# Patient Record
Sex: FEMALE | Race: WHITE | NOT HISPANIC OR LATINO | Employment: UNEMPLOYED | URBAN - METROPOLITAN AREA
[De-identification: names, ages, dates, MRNs, and addresses within clinical notes are randomized per-mention and may not be internally consistent; named-entity substitution may affect disease eponyms.]

---

## 2018-03-01 DIAGNOSIS — R15.1 FECAL SOILING: ICD-10-CM

## 2018-03-01 DIAGNOSIS — R32 URINE INCONTINENCE: Primary | ICD-10-CM

## 2018-03-07 ENCOUNTER — CLINICAL SUPPORT (OUTPATIENT)
Dept: REHABILITATION | Facility: OTHER | Age: 38
End: 2018-03-07
Attending: OBSTETRICS & GYNECOLOGY
Payer: COMMERCIAL

## 2018-03-07 DIAGNOSIS — N94.10 DYSPAREUNIA, FEMALE: ICD-10-CM

## 2018-03-07 DIAGNOSIS — M62.838 MUSCLE SPASM: ICD-10-CM

## 2018-03-07 DIAGNOSIS — R39.15 URINARY URGENCY: ICD-10-CM

## 2018-03-07 DIAGNOSIS — R35.0 URINARY FREQUENCY: Primary | ICD-10-CM

## 2018-03-07 PROCEDURE — 97162 PT EVAL MOD COMPLEX 30 MIN: CPT

## 2018-03-07 PROCEDURE — 97110 THERAPEUTIC EXERCISES: CPT

## 2018-03-07 PROCEDURE — 97161 PT EVAL LOW COMPLEX 20 MIN: CPT

## 2018-03-07 PROCEDURE — 97535 SELF CARE MNGMENT TRAINING: CPT

## 2018-03-07 NOTE — PATIENT INSTRUCTIONS
Walking daily 20-30 minutes    Avoid crossing legs when you sit, cross at ankles    Squatty Potty and exhalation with pooping (blowing out birthday candles)    Hook-Lying        Lie with hips and knees bent. Allow bodys muscles to relax. Place hands on belly. Inhale slowly and deeply for 3 seconds, so hands move up. Then take 3 seconds to exhale. Repeat 3 times. Do 2-3 times a day.      Knee-to-Chest Stretch: Unilateral        With hand behind right knee, pull knee in to chest until a comfortable stretch is felt in lower back and buttocks. Keep back relaxed. Hold 5 breaths.  Repeat 3 times per set. Repeat on Left and Right sides. Do 1 set per session. Do 2-3 sessions per day.     [email or web address]     Copyright © [employer]. All rights reserved.   Knee-to-Chest Stretch: Bilateral        With hands behind knees, pull both knees in to chest until a comfortable stretch is felt in lower back and buttocks. Keep back relaxed. Hold 5 breaths.  Repeat 3 times per set. Do 1 set per session. Do 2-3 sessions per day.     [email or web address]

## 2018-03-07 NOTE — PROGRESS NOTES
OUTPATIENT PHYSICAL THERAPY EVALUATION        Patient: Akanksha Campos   [facility] #:  [id number]    Date of treatment: 03/07/2018   Time in: 100  Time out: 200  Billable time: 60  # Visits: 1/20  Auth: 12/31/2018  POC expiration: 6/13/2018      HISTORY      Akanksha is a 37 y.o. female evaluated on 03/07/2018    Physician:  Kathie Howell MD   Diagnosis:   Encounter Diagnoses   Name Primary?    Urinary frequency Yes    Dyspareunia, female     Urinary urgency     Muscle spasm       Treatment ordered: Physical Therapy  Medical History:   Past Medical History:   Diagnosis Date    Anxiety     Depression       Surgical History: No past surgical history on file.   Medications:   Current Outpatient Prescriptions   Medication Sig    PNV #[id number] #6-DHA ORAL Take by mouth.     No current facility-administered medications for this visit.        Allergies: Review of patient's allergies indicates:  No Known Allergies     Precautions: universal    OB/GYN History:  childbirth vaginal delivery and perineal laceration    Bladder/Bowel History: urinary incontinence and trouble holding back gas/feces      SUBJECTIVE     Date of onset: 3 years ago following birth of first son  History of current complaint: Patient presents with complaints of urinary and fecal incontinence. She has a 2-3 degree tear following unmedicated vaginal delivery of her first child. Symptoms persisted and continue after birth of her second. She has decreased anorectal sensation per an anorectal manometry test. She stats she feels like she cannot get clean in her anal area following a bowel movement. She has recently been using a bidae at home which helps. She has 1-2 BMs per day that are soft. She feels she does not empty completely. She wears ICON underwear for urinary incontinence. She voids 6-8 times day. She states she doesn't know when urinary incontinence occurs because she doesn't feel it. She is currently sexually active and denies current pan  with intercourse, although did have dyspareunia following first childbirth. She is still nursing her 10 month old child. Menstruation has not returned.   Patient's goals for therapy: Decreased urinary and fecal incontinence  Pain: Patient reports 0/10, with 0 being the lowest and 10 being the highest.    Previous treatment included PT by Mami Dejesus in 2015    Sexually active? Yes    Types of fluid intake: water  Diet:normal  Current exercise:walking and high intensity exercise      OBJECTIVE     ORTHO SCREEN  Posture: WNL and flexed posturing  Pelvic alignment: no sign of deviations noted in supine    ABDOMINALS  Scarring: none  Diastasis: 1 fingers above umbilicus, 0 fingers at umbilicus, 0 fingers below umbilicus   Chaperone: refused    VAGINAL PELVIC FLOOR EXAM    EXTERNAL ASSESSMENT  Introitus: WNL  Skin condition: redness noted  Scarring: scar noted   Sensation: WNL   Pain:  Q tip test positive at 3 and 6 o clock  Voluntary contraction: visible lift  Voluntary relaxation: visible drop  Involuntary contraction: bulge  Bearing down: bulge  Perineal descent: absent      INTERNAL ASSESSMENT  Pain: trigger points as follows: Left and Right LA and OI introital opening  Sensation: WNL  Vaginal vault: decreased length   Muscle Bulk: hypertonus   Prolapse check:redundant tissue noted anteriorly  Comments: urethral hypermobility    RECTAL PELVIC FLOOR EXAM  EXTERNAL ASSESSMENT  Anus: WNL  Skin condition: WNL   Scarring: none  Sensation: WNL   Pain:  none  Voluntary contraction: visible lift  Voluntary relaxation: visible drop  Involuntary contraction: bulge  Bearing down: bulge  Anal [place]: intact  Discharge: none       INTERNAL ASSESSMENT  EAS tone: hypertonic   Impaction: none   Pain: trigger points as follows: L and Right LA and OI  Sensation: able to localize pressure appropriately   Muscle Bulk: hypertonus   Comments: proper bulge with bearing down      TREATMENT      Therapeutic Exercise to develop  flexibility for  10 minutes including: diaphragmatic breathing with knee to chest stretches    Education: instructed on general anatomy/physiology of urinary/bowel system; discussed plan of care with patient and parent/guardian; instructed in benefits/risks of treatment; instructed in alternative methods of treatment; instructed in risks of refusing treatment; patient a parent agreed to treatment plan.     Also educated in: posture/body mechanices, diaphragmatic breathing, proper bearing down techniques and walking    ASSESSMENT      This is a 37 y.o. female referred to outpatient physical therapy and presents with a medical diagnosis of urinary and fecal incontinence. Patient will benefit from skilled physical therapy to downtrain pelvic floor muscles and progress to uptraining as muscles return to normal resting length.  Educational/Spiritual/Cultural needs: none  Abuse/Neglect: no signs  Nutritional Status: WDWN   Fall Risk: none    Pt's spiritual, cultural and educational needs considered and pt agreeable to plan of care and goals as stated below:     Medical necessity is demonstrated by the following IMPAIRMENTS/PROMBLEMS     History  Co-morbidities and personal factors that may impact the plan of care Examination  Body Structures and Functions, activity limitations and participation restrictions that may impact the plan of care Clinical Presentation   Decision Making/ Complexity Score   Co-morbidities:                 Personal Factors:    Body Regions/Systems/Functions:    poor knowledge of body mechanics and posture, pelvic floor tenderness and increased tension of the pelvic muscles           Activity limitations:   Barriers to Learning: none  Environmental Barriers: none noted    Participation Restrictions:           low           PLAN    Frequency: 1 x week  Duration: 12 weeks  Short Term Goals: 4 weeks   Pt will report improved ability to perform ADLs (ie. dressing, bathing, functional transfers) with little (drops) to no  urinary leakage 5/7 days per week.  Pt will verbalize improved awareness of PFM activity as palpated by PT in order to improve activity involvement with HEP.    Long Term Goals: 12 weeks   Pt will report improved ability to perform ADLs (ie. dressing, bathing, functional transfers) with little (drops) to no fecal leakage 5/7 days per week.   Pt/family will be independent with HEP for continued self-management of symptoms.    Physical therapy will include: therapeutic exercises, therapeutic activity, neuromuscular re-education, manual therapy, modalities PRN, patient/family education, dry needling and self care/home management      Therapist: Noemí Lynch, PT PT, DPT, WCS  3/7/2018

## 2018-03-13 NOTE — PLAN OF CARE
OUTPATIENT PHYSICAL THERAPY EVALUATION          Patient: Akanksha Campos   [facility] #:  [id number]    Date of treatment: 03/07/2018   Time in: 100  Time out: 200  Billable time: 60  # Visits: 1/20  Auth: 12/31/2018  POC expiration: 6/13/2018        HISTORY       Akanksha is a 37 y.o. female evaluated on 03/07/2018     Physician:  Kathie Howell MD   Diagnosis:        Encounter Diagnoses   Name Primary?    Urinary frequency Yes    Dyspareunia, female      Urinary urgency      Muscle spasm        Treatment ordered: Physical Therapy  Medical History:        Past Medical History:   Diagnosis Date    Anxiety      Depression        Surgical History: No past surgical history on file.   Medications:        Current Outpatient Prescriptions   Medication Sig    PNV #35-[id number] #6-DHA ORAL Take by mouth.      No current facility-administered medications for this visit.        Allergies: Review of patient's allergies indicates:  No Known Allergies      Precautions: universal     OB/GYN History:  childbirth vaginal delivery and perineal laceration     Bladder/Bowel History: urinary incontinence and trouble holding back gas/feces        SUBJECTIVE      Date of onset: 3 years ago following birth of first son  History of current complaint: Patient presents with complaints of urinary and fecal incontinence. She has a 2-3 degree tear following unmedicated vaginal delivery of her first child. Symptoms persisted and continue after birth of her second. She has decreased anorectal sensation per an anorectal manometry test. She stats she feels like she cannot get clean in her anal area following a bowel movement. She has recently been using a bidae at home which helps. She has 1-2 BMs per day that are soft. She feels she does not empty completely. She wears ICON underwear for urinary incontinence. She voids 6-8 times day. She states she doesn't know when urinary incontinence occurs because she doesn't feel it. She is currently sexually  active and denies current pan with intercourse, although did have dyspareunia following first childbirth. She is still nursing her 10 month old child. Menstruation has not returned.   Patient's goals for therapy: Decreased urinary and fecal incontinence  Pain: Patient reports 0/10, with 0 being the lowest and 10 being the highest.     Previous treatment included PT by Mami Dejesus in 2015     Sexually active? Yes     Types of fluid intake: water  Diet:normal  Current exercise:walking and high intensity exercise        OBJECTIVE      ORTHO SCREEN  Posture: WNL and flexed posturing  Pelvic alignment: no sign of deviations noted in supine     ABDOMINALS  Scarring: none  Diastasis: 1 fingers above umbilicus, 0 fingers at umbilicus, 0 fingers below umbilicus   Chaperone: refused     VAGINAL PELVIC FLOOR EXAM     EXTERNAL ASSESSMENT  Introitus: WNL  Skin condition: redness noted  Scarring: scar noted   Sensation: WNL   Pain:  Q tip test positive at 3 and 6 o clock  Voluntary contraction: visible lift  Voluntary relaxation: visible drop  Involuntary contraction: bulge  Bearing down: bulge  Perineal descent: absent                            INTERNAL ASSESSMENT  Pain: trigger points as follows: Left and Right LA and OI introital opening  Sensation: WNL  Vaginal vault: decreased length   Muscle Bulk: hypertonus   Prolapse check:redundant tissue noted anteriorly  Comments: urethral hypermobility     RECTAL PELVIC FLOOR EXAM  EXTERNAL ASSESSMENT  Anus: WNL  Skin condition: WNL   Scarring: none  Sensation: WNL   Pain:  none  Voluntary contraction: visible lift  Voluntary relaxation: visible drop  Involuntary contraction: bulge  Bearing down: bulge  Anal [place]: intact  Discharge: none                             INTERNAL ASSESSMENT  EAS tone: hypertonic   Impaction: none   Pain: trigger points as follows: L and Right LA and OI  Sensation: able to localize pressure appropriately   Muscle Bulk: hypertonus   Comments: proper bulge  with bearing down        TREATMENT       Therapeutic Exercise to develop  flexibility for 10 minutes including: diaphragmatic breathing with knee to chest stretches     Education: instructed on general anatomy/physiology of urinary/bowel system; discussed plan of care with patient and parent/guardian; instructed in benefits/risks of treatment; instructed in alternative methods of treatment; instructed in risks of refusing treatment; patient a parent agreed to treatment plan.      Also educated in: posture/body mechanices, diaphragmatic breathing, proper bearing down techniques and walking     ASSESSMENT       This is a 37 y.o. female referred to outpatient physical therapy and presents with a medical diagnosis of urinary and fecal incontinence. Patient will benefit from skilled physical therapy to downtrain pelvic floor muscles and progress to uptraining as muscles return to normal resting length.  Educational/Spiritual/Cultural needs: none  Abuse/Neglect: no signs  Nutritional Status: WDWN   Fall Risk: none     Pt's spiritual, cultural and educational needs considered and pt agreeable to plan of care and goals as stated below:      Medical necessity is demonstrated by the following IMPAIRMENTS/PROMBLEMS      History  Co-morbidities and personal factors that may impact the plan of care Examination  Body Structures and Functions, activity limitations and participation restrictions that may impact the plan of care Clinical Presentation    Decision Making/ Complexity Score   Co-morbidities:                        Personal Factors:     Body Regions/Systems/Functions:     poor knowledge of body mechanics and posture, pelvic floor tenderness and increased tension of the pelvic muscles               Activity limitations:   Barriers to Learning: none  Environmental Barriers: none noted     Participation Restrictions:                low               PLAN    Frequency: 1 x week  Duration: 12 weeks  Short Term Goals: 4 weeks    Pt will report improved ability to perform ADLs (ie. dressing, bathing, functional transfers) with little (drops) to no urinary leakage 5/7 days per week.  Pt will verbalize improved awareness of PFM activity as palpated by PT in order to improve activity involvement with HEP.     Long Term Goals: 12 weeks   Pt will report improved ability to perform ADLs (ie. dressing, bathing, functional transfers) with little (drops) to no fecal leakage 5/7 days per week.   Pt/family will be independent with HEP for continued self-management of symptoms.     Physical therapy will include: therapeutic exercises, therapeutic activity, neuromuscular re-education, manual therapy, modalities PRN, patient/family education, dry needling and self care/home management        Therapist: Noemí Lynch, PT PT, DPT, WCS  3/7/2018

## 2018-03-21 ENCOUNTER — CLINICAL SUPPORT (OUTPATIENT)
Dept: REHABILITATION | Facility: OTHER | Age: 38
End: 2018-03-21
Attending: OBSTETRICS & GYNECOLOGY
Payer: COMMERCIAL

## 2018-03-21 DIAGNOSIS — N94.10 DYSPAREUNIA, FEMALE: Primary | ICD-10-CM

## 2018-03-21 DIAGNOSIS — R15.1 FECAL SMEARING: ICD-10-CM

## 2018-03-21 DIAGNOSIS — R39.15 URINARY URGENCY: ICD-10-CM

## 2018-03-21 DIAGNOSIS — R35.0 URINARY FREQUENCY: ICD-10-CM

## 2018-03-21 PROCEDURE — 97110 THERAPEUTIC EXERCISES: CPT

## 2018-03-21 PROCEDURE — 97140 MANUAL THERAPY 1/> REGIONS: CPT

## 2018-03-21 PROCEDURE — 97535 SELF CARE MNGMENT TRAINING: CPT

## 2018-03-21 NOTE — PROGRESS NOTES
OUTPATIENT PHYSICAL THERAPY DAILY NOTE       Patient: Akanksha Campos   [facility] #:  [id number]    Diagnosis:   Encounter Diagnoses   Name Primary?    Dyspareunia, female Yes    Urinary frequency     Urinary urgency     Fecal smearing       Date of treatment: 03/21/2018     Time in: 903  Time out: 1000  Billable time: 55  Visit #: 2      SUBJECTIVE   Pt reports: Patient states she is under very high stress due to an upcoming move. She states this typically causes diarrhea and loose stool. When she has a bowel movement she empties 60-70% of stool then feels she has to go again later or has some fecal smearing. Continued urinary urgency and incontinence. She is taking a sleeping calm supplement with Magnesium which is contributing to softer stools.   Pain: 0/10 on VAS scale    OBJECTIVE     Therapeutic Exercise to develop  flexibility for 10 minutes including:   diaphragmatic breathing and passive stretching of pfm : Knee to chest stretches in supine and standing squat  Practice with diaphragmatic breath with PFM contraction    Manual Therapy to develop flexibility and desensitization for 30 minutes including: Manual Therapy including STM and Trigger point massage to abdominals, adductors, tissues medial to ischial tuberosities, intravaginal MT to levator ani and intrarectal to puborectalis    Education: Discussed progression of plan of care with patient; educated pt in activity modification; reviewed HEP with pt. Pt demonstrated and verbalized understanding of all instruction and was provided with a handout of HEP (see Patient Instructions).  Order Therawand      ASSESSMENT      Pt tolerated entire treatment  with no visible adverse effects. Increased tension of puborectalis and tenderness to palpation. Decreased tension to levator ani intravaginally.   Will the patient continue to benefit from skilled PT intervention? Yes  Medical necessity demonstrated by: incontinence  Progress towards goals:  good      PLAN      Continue with established Plan of Care, working toward established PT goals.   Educate on Use of Therawand for home use vaginally OR rectally, intravaginal and Intrarectal Mt. Progress to uptraining when muscles rlaxed

## 2018-04-02 ENCOUNTER — OFFICE VISIT (OUTPATIENT)
Dept: URGENT CARE | Facility: CLINIC | Age: 38
End: 2018-04-02
Payer: COMMERCIAL

## 2018-04-02 VITALS
WEIGHT: 204 LBS | OXYGEN SATURATION: 97 % | SYSTOLIC BLOOD PRESSURE: 112 MMHG | HEART RATE: 78 BPM | HEIGHT: 69 IN | RESPIRATION RATE: 18 BRPM | BODY MASS INDEX: 30.21 KG/M2 | DIASTOLIC BLOOD PRESSURE: 76 MMHG | TEMPERATURE: 98 F

## 2018-04-02 DIAGNOSIS — J06.9 UPPER RESPIRATORY TRACT INFECTION, UNSPECIFIED TYPE: ICD-10-CM

## 2018-04-02 DIAGNOSIS — J02.9 SORE THROAT: Primary | ICD-10-CM

## 2018-04-02 LAB
CTP QC/QA: YES
S PYO RRNA THROAT QL PROBE: NEGATIVE

## 2018-04-02 PROCEDURE — 96372 THER/PROPH/DIAG INJ SC/IM: CPT | Mod: S$GLB,,, | Performed by: EMERGENCY MEDICINE

## 2018-04-02 PROCEDURE — 99203 OFFICE O/P NEW LOW 30 MIN: CPT | Mod: 25,S$GLB,, | Performed by: PHYSICIAN ASSISTANT

## 2018-04-02 PROCEDURE — 87880 STREP A ASSAY W/OPTIC: CPT | Mod: QW,S$GLB,, | Performed by: PHYSICIAN ASSISTANT

## 2018-04-02 RX ORDER — BETAMETHASONE SODIUM PHOSPHATE AND BETAMETHASONE ACETATE 3; 3 MG/ML; MG/ML
6 INJECTION, SUSPENSION INTRA-ARTICULAR; INTRALESIONAL; INTRAMUSCULAR; SOFT TISSUE
Status: COMPLETED | OUTPATIENT
Start: 2018-04-02 | End: 2018-04-02

## 2018-04-02 RX ORDER — MULTIVITAMIN
1 TABLET ORAL DAILY
COMMUNITY

## 2018-04-02 RX ADMIN — BETAMETHASONE SODIUM PHOSPHATE AND BETAMETHASONE ACETATE 6 MG: 3; 3 INJECTION, SUSPENSION INTRA-ARTICULAR; INTRALESIONAL; INTRAMUSCULAR; SOFT TISSUE at 06:04

## 2018-04-02 NOTE — PATIENT INSTRUCTIONS
Self-Care for Sore Throats    Sore throats happen for many reasons, such as colds, allergies, and infections caused by viruses or bacteria. In any case, your throat becomes red and sore. Your goal for self-care is to reduce your discomfort while giving your throat a chance to heal.  Moisten and soothe your throat  Tips include the following:  · Try a sip of water first thing after waking up.  · Keep your throat moist by drinking 6 or more glasses of clear liquids every day.  · Run a cool-air humidifier in your room overnight.  · Avoid cigarette smoke.   · Suck on throat lozenges, cough drops, hard candy, ice chips, or frozen fruit-juice bars. Use the sugar-free versions if your diet or medical condition requires them.  Gargle to ease irritation  Gargling every hour or 2 can ease irritation. Try gargling with 1 of these solutions:  · 1/4 teaspoon of salt in 1/2 cup of warm water  · An over-the-counter anesthetic gargle  Use medicine for more relief  Over-the-counter medicine can reduce sore throat symptoms. Ask your pharmacist if you have questions about which medicine to use:  · Ease pain with anesthetic sprays. Aspirin or an aspirin substitute also helps. Remember, never give aspirin to anyone 18 or younger, or if you are already taking blood thinners.   · For sore throats caused by allergies, try antihistamines to block the allergic reaction.  · Remember: unless a sore throat is caused by a bacterial infection, antibiotics wont help you.  Prevent future sore throats  Prevention tips include the following:  · Stop smoking or reduce contact with secondhand smoke. Smoke irritates the tender throat lining.  · Limit contact with pets and with allergy-causing substances, such as pollen and mold.  · When youre around someone with a sore throat or cold, wash your hands often to keep viruses or bacteria from spreading.  · Dont strain your vocal cords.  Call your healthcare provider  Contact your healthcare provider if  you have:  · A temperature over 101°F (38.3°C)  · White spots on the throat  · Great difficulty swallowing  · Trouble breathing  · A skin rash  · Recent exposure to someone else with strep bacteria  · Severe hoarseness and swollen glands in the neck or jaw   Date Last Reviewed: 8/1/2016  © 9645-3312 [employer] [address] [place] [address]. All rights reserved. This information is not intended as a substitute for professional medical care. Always follow your healthcare professional's instructions.        When You Have a Sore Throat    A sore throat can be painful. There are many reasons why you may have a sore throat. Your healthcare provider will work with you to find the cause of your sore throat. He or she will also find the best treatment for you.  What causes a sore throat?  Sore throats can be caused or worsened by:  · Cold or flu viruses  · Bacteria  · Irritants such as tobacco smoke or air pollution  · Acid reflux  A healthy throat  The tonsils are on the sides of the throat near the base of the tongue. They collect viruses and bacteria and help fight infection. The throat (pharynx) is the passage for air. Mucus from the nasal cavity also moves down the passage.  An inflamed throat  The tonsils and pharynx can become inflamed due to a cold or flu virus. Postnasal drip (excess mucus draining from the nasal cavity) can irritate the throat. It can also make the throat or tonsils more likely to be infected by bacteria. Severe, untreated tonsillitis in children or adults can cause a pocket of pus (abscess) to form near the tonsil.  Your evaluation  A medical evaluation can help find the cause of your sore throat. It can also help your healthcare provider choose the best treatment for you. The evaluation may include a health history, physical exam, and diagnostic tests.  Health history  Your healthcare provider may ask you the following:  · How long has the sore throat lasted and how  have you been treating it?  · Do you have any other symptoms, such as body aches, fever, or cough?  · Does your sore throat recur? If so, how often? How many days of school or work have you missed because of a sore throat?  · Do you have trouble eating or swallowing?  · Have you been told that you snore or have other sleep problems?  · Do you have bad breath?  · Do you cough up bad-tasting mucus?  Physical exam  During the exam, your healthcare provider checks your ears, nose, and throat for problems. He or she also checks for swelling in the neck, and may listen to your chest.  Possible tests  Other tests your healthcare provider may perform include:  · A throat swab to check for bacteria such as streptococcus (the bacteria that causes strep throat)  · A blood test to check for mononucleosis (a viral infection)  · A chest X-ray to rule out pneumonia, especially if you have a cough  Treating a sore throat  Treatment depends on many factors. What is the likely cause? Is the problem recent? Does it keep coming back? In many cases, the best thing to do is to treat the symptoms, rest, and let the problem heal itself. Antibiotics may help clear up some bacterial infections. For cases of severe or recurring tonsillitis, the tonsils may need to be removed.  Relieving your symptoms  · Dont smoke, and avoid secondhand smoke.  · For children, try throat sprays or Popsicles. Adults and older children may try lozenges.  · Drink warm liquids to soothe the throat and help thin mucus. Avoid alcohol, spicy foods, and acidic drinks such as orange juice. These can irritate the throat.  · Gargle with warm saltwater (1 teaspoon of salt to 8 ounces of warm water).  · Use a humidifier to keep air moist and relieve throat dryness.  · Try over-the-counter pain relievers such as acetaminophen or ibuprofen. Use as directed, and dont exceed the recommended dose. Dont give aspirin to children.   Are antibiotics needed?  If your sore throat  "is due to a bacterial infection, antibiotics may speed healing and prevent complications. Although group A streptococcus ("strep throat" or GAS) is the major treatable infection for a sore throat, GAS causes only 5% to 15% of sore throats in adults who seek medical care. Most sore throats are caused by cold or flu viruses. And antibiotics dont treat viral illness. In fact, using antibiotics when theyre not needed may produce bacteria that are harder to kill. Your healthcare provider will prescribe antibiotics only if he or she thinks they are likely to help.  If antibiotics are prescribed  Take the medicine exactly as directed. Be sure to finish your prescription even if youre feeling better. And be sure to ask your healthcare provider or pharmacist what side effects are common and what to do about them.  Is surgery needed?  In some cases, tonsils need to be removed. This is often done as outpatient (same-day) surgery. Your healthcare provider may advise removing the tonsils in cases of:  · Several severe bouts of tonsillitis in a year. Severe episodes include those that lead to missed days of school or work, or that need to be treated with antibiotics.  · Tonsillitis that causes breathing problems during sleep  · Tonsillitis caused by food particles collecting in pouches in the tonsils (cryptic tonsillitis)  Call your healthcare provider if any of the following occur:  · Symptoms worsen, or new symptoms develop.  · Swollen tonsils make breathing difficult.  · The pain is severe enough to keep you from drinking liquids.  · A skin rash, hives, or wheezing develops. Any of these could signal an allergic reaction to antibiotics.  · Symptoms dont improve within a week.  · Symptoms dont improve within 2 to 3 days of starting antibiotics.   Date Last Reviewed: 10/1/2016  © 5262-5198 [employer]. [address] [place] [address], [place] [address]. All rights reserved. This information is not intended as a " substitute for professional medical care. Always follow your healthcare professional's instructions.      Results for orders placed or performed in visit on 04/02/18   POCT rapid strep A   Result Value Ref Range    Rapid Strep A Screen Negative Negative     Acceptable Yes

## 2018-04-02 NOTE — PROGRESS NOTES
"Subjective:       Patient ID: Akanksha Campos is a 37 y.o. female.    Vitals:  height is 5' 9" (1.753 m) and weight is 92.5 kg (204 lb). Her oral temperature is 97.6 °F (36.4 °C). Her blood pressure is 112/76 and her pulse is 78. Her respiration is 18 and oxygen saturation is 97%.     Chief Complaint: Sore Throat    The pt is 37 y.o. female with a sore throat. Symptoms began 1 day ago. The pain is moderate. The pain is primarily on (the)  both sides. Fever is absent.   Other associated symptoms hav e included: cough, nasal congestion, post nasal drip x7days. Fluid intake is good.    The patient has been treated with the following antibiotics : no recent courses . Current OTC  medications include ibuprofen. There  has not been improvement with this treatment regimen.    Patient is mother of 10month old and 3 y.o. Both children currently are sick. Mom reprots 3 y.o. Was dx with PTA and placed on clidamycin.    PMH  -denies  - nkda    Social  - Mother of two  - denies speaking      Sore Throat    This is a new problem. The current episode started yesterday. The problem has been gradually worsening. Associated symptoms include congestion, coughing, a hoarse voice, swollen glands and trouble swallowing. Pertinent negatives include no abdominal pain, ear discharge, ear pain, headaches, plugged ear sensation or shortness of breath. She has had no exposure to strep or mono. Treatments tried: advil. The treatment provided mild relief.     Review of Systems   Constitution: Negative for chills, fever and malaise/fatigue.   HENT: Positive for congestion, hoarse voice, sore throat and trouble swallowing. Negative for ear discharge and ear pain.    Eyes: Negative for discharge and redness.   Cardiovascular: Negative for chest pain, dyspnea on exertion and leg swelling.   Respiratory: Positive for cough and sputum production. Negative for shortness of breath and wheezing.    Musculoskeletal: Negative for myalgias. "   Gastrointestinal: Negative for abdominal pain and nausea.   Neurological: Negative for headaches.       Objective:      Physical Exam   Constitutional: She is oriented to person, place, and time. She appears well-developed and well-nourished. She is cooperative.  Non-toxic appearance. She does not appear ill. No distress.   HENT:   Head: Normocephalic and atraumatic.   Right Ear: Hearing, tympanic membrane, external ear and ear canal normal. No tenderness. Tympanic membrane is not erythematous and not retracted. No middle ear effusion.   Left Ear: Hearing, tympanic membrane, external ear and ear canal normal. No tenderness. Tympanic membrane is not erythematous and not retracted.  No middle ear effusion.   Nose: Nose normal. No mucosal edema, rhinorrhea or nasal deformity. No epistaxis. Right sinus exhibits no maxillary sinus tenderness and no frontal sinus tenderness. Left sinus exhibits no maxillary sinus tenderness and no frontal sinus tenderness.   Mouth/Throat: Uvula is midline and mucous membranes are normal. No trismus in the jaw. Normal dentition. No uvula swelling. Posterior oropharyngeal erythema present. Tonsils are 3+ on the right. Tonsils are 3+ on the left. Tonsillar exudate.   Eyes: Conjunctivae and lids are normal. No scleral icterus.   Sclera clear bilat   Neck: Trachea normal, full passive range of motion without pain and phonation normal. Neck supple.   Cardiovascular: Normal rate, regular rhythm, normal heart sounds, intact distal pulses and normal pulses.    Pulmonary/Chest: Effort normal and breath sounds normal. No respiratory distress.   Abdominal: Soft. Normal appearance and bowel sounds are normal. She exhibits no distension. There is no tenderness.   Musculoskeletal: Normal range of motion. She exhibits no edema or deformity.   Neurological: She is alert and oriented to person, place, and time. She exhibits normal muscle tone. Coordination normal.   Skin: Skin is warm, dry and intact.  She is not diaphoretic. No pallor.   Psychiatric: She has a normal mood and affect. Her speech is normal and behavior is normal. Judgment and thought content normal. Cognition and memory are normal.   Nursing note and vitals reviewed.        Results for orders placed or performed in visit on 04/02/18   POCT rapid strep A   Result Value Ref Range    Rapid Strep A Screen Negative Negative     Acceptable Yes        Assessment:       1. Sore throat    2. Upper respiratory tract infection, unspecified type        Plan:         Sore throat  -     POCT rapid strep A  -     betamethasone acetate-betamethasone sodium phosphate injection 6 mg; Inject 1 mL (6 mg total) into the muscle one time.    Upper respiratory tract infection, unspecified type      Patient Instructions       Self-Care for Sore Throats    Sore throats happen for many reasons, such as colds, allergies, and infections caused by viruses or bacteria. In any case, your throat becomes red and sore. Your goal for self-care is to reduce your discomfort while giving your throat a chance to heal.  Moisten and soothe your throat  Tips include the following:  · Try a sip of water first thing after waking up.  · Keep your throat moist by drinking 6 or more glasses of clear liquids every day.  · Run a cool-air humidifier in your room overnight.  · Avoid cigarette smoke.   · Suck on throat lozenges, cough drops, hard candy, ice chips, or frozen fruit-juice bars. Use the sugar-free versions if your diet or medical condition requires them.  Gargle to ease irritation  Gargling every hour or 2 can ease irritation. Try gargling with 1 of these solutions:  · 1/4 teaspoon of salt in 1/2 cup of warm water  · An over-the-counter anesthetic gargle  Use medicine for more relief  Over-the-counter medicine can reduce sore throat symptoms. Ask your pharmacist if you have questions about which medicine to use:  · Ease pain with anesthetic sprays. Aspirin or an aspirin  substitute also helps. Remember, never give aspirin to anyone 18 or younger, or if you are already taking blood thinners.   · For sore throats caused by allergies, try antihistamines to block the allergic reaction.  · Remember: unless a sore throat is caused by a bacterial infection, antibiotics wont help you.  Prevent future sore throats  Prevention tips include the following:  · Stop smoking or reduce contact with secondhand smoke. Smoke irritates the tender throat lining.  · Limit contact with pets and with allergy-causing substances, such as pollen and mold.  · When youre around someone with a sore throat or cold, wash your hands often to keep viruses or bacteria from spreading.  · Dont strain your vocal cords.  Call your healthcare provider  Contact your healthcare provider if you have:  · A temperature over 101°F (38.3°C)  · White spots on the throat  · Great difficulty swallowing  · Trouble breathing  · A skin rash  · Recent exposure to someone else with strep bacteria  · Severe hoarseness and swollen glands in the neck or jaw   Date Last Reviewed: 8/1/2016 © 2000-2017 [employer]. [address] [place] [address]. All rights reserved. This information is not intended as a substitute for professional medical care. Always follow your healthcare professional's instructions.        When You Have a Sore Throat    A sore throat can be painful. There are many reasons why you may have a sore throat. Your healthcare provider will work with you to find the cause of your sore throat. He or she will also find the best treatment for you.  What causes a sore throat?  Sore throats can be caused or worsened by:  · Cold or flu viruses  · Bacteria  · Irritants such as tobacco smoke or air pollution  · Acid reflux  A healthy throat  The tonsils are on the sides of the throat near the base of the tongue. They collect viruses and bacteria and help fight infection. The throat (pharynx) is the passage  for air. Mucus from the nasal cavity also moves down the passage.  An inflamed throat  The tonsils and pharynx can become inflamed due to a cold or flu virus. Postnasal drip (excess mucus draining from the nasal cavity) can irritate the throat. It can also make the throat or tonsils more likely to be infected by bacteria. Severe, untreated tonsillitis in children or adults can cause a pocket of pus (abscess) to form near the tonsil.  Your evaluation  A medical evaluation can help find the cause of your sore throat. It can also help your healthcare provider choose the best treatment for you. The evaluation may include a health history, physical exam, and diagnostic tests.  Health history  Your healthcare provider may ask you the following:  · How long has the sore throat lasted and how have you been treating it?  · Do you have any other symptoms, such as body aches, fever, or cough?  · Does your sore throat recur? If so, how often? How many days of school or work have you missed because of a sore throat?  · Do you have trouble eating or swallowing?  · Have you been told that you snore or have other sleep problems?  · Do you have bad breath?  · Do you cough up bad-tasting mucus?  Physical exam  During the exam, your healthcare provider checks your ears, nose, and throat for problems. He or she also checks for swelling in the neck, and may listen to your chest.  Possible tests  Other tests your healthcare provider may perform include:  · A throat swab to check for bacteria such as streptococcus (the bacteria that causes strep throat)  · A blood test to check for mononucleosis (a viral infection)  · A chest X-ray to rule out pneumonia, especially if you have a cough  Treating a sore throat  Treatment depends on many factors. What is the likely cause? Is the problem recent? Does it keep coming back? In many cases, the best thing to do is to treat the symptoms, rest, and let the problem heal itself. Antibiotics may help  "clear up some bacterial infections. For cases of severe or recurring tonsillitis, the tonsils may need to be removed.  Relieving your symptoms  · Dont smoke, and avoid secondhand smoke.  · For children, try throat sprays or Popsicles. Adults and older children may try lozenges.  · Drink warm liquids to soothe the throat and help thin mucus. Avoid alcohol, spicy foods, and acidic drinks such as orange juice. These can irritate the throat.  · Gargle with warm saltwater (1 teaspoon of salt to 8 ounces of warm water).  · Use a humidifier to keep air moist and relieve throat dryness.  · Try over-the-counter pain relievers such as acetaminophen or ibuprofen. Use as directed, and dont exceed the recommended dose. Dont give aspirin to children.   Are antibiotics needed?  If your sore throat is due to a bacterial infection, antibiotics may speed healing and prevent complications. Although group A streptococcus ("strep throat" or GAS) is the major treatable infection for a sore throat, GAS causes only 5% to 15% of sore throats in adults who seek medical care. Most sore throats are caused by cold or flu viruses. And antibiotics dont treat viral illness. In fact, using antibiotics when theyre not needed may produce bacteria that are harder to kill. Your healthcare provider will prescribe antibiotics only if he or she thinks they are likely to help.  If antibiotics are prescribed  Take the medicine exactly as directed. Be sure to finish your prescription even if youre feeling better. And be sure to ask your healthcare provider or pharmacist what side effects are common and what to do about them.  Is surgery needed?  In some cases, tonsils need to be removed. This is often done as outpatient (same-day) surgery. Your healthcare provider may advise removing the tonsils in cases of:  · Several severe bouts of tonsillitis in a year. Severe episodes include those that lead to missed days of school or work, or that need to be " treated with antibiotics.  · Tonsillitis that causes breathing problems during sleep  · Tonsillitis caused by food particles collecting in pouches in the tonsils (cryptic tonsillitis)  Call your healthcare provider if any of the following occur:  · Symptoms worsen, or new symptoms develop.  · Swollen tonsils make breathing difficult.  · The pain is severe enough to keep you from drinking liquids.  · A skin rash, hives, or wheezing develops. Any of these could signal an allergic reaction to antibiotics.  · Symptoms dont improve within a week.  · Symptoms dont improve within 2 to 3 days of starting antibiotics.   Date Last Reviewed: 10/1/2016  © 7259-1408 [employer]. [address] [place] [address], [place] [address]. All rights reserved. This information is not intended as a substitute for professional medical care. Always follow your healthcare professional's instructions.      Results for orders placed or performed in visit on 04/02/18   POCT rapid strep A   Result Value Ref Range    Rapid Strep A Screen Negative Negative     Acceptable Yes

## 2018-04-11 ENCOUNTER — CLINICAL SUPPORT (OUTPATIENT)
Dept: REHABILITATION | Facility: OTHER | Age: 38
End: 2018-04-11
Attending: ANESTHESIOLOGY
Payer: COMMERCIAL

## 2018-04-11 DIAGNOSIS — R35.0 URINARY FREQUENCY: ICD-10-CM

## 2018-04-11 DIAGNOSIS — R39.15 URINARY URGENCY: Primary | ICD-10-CM

## 2018-04-11 DIAGNOSIS — M62.838 MUSCLE SPASM: ICD-10-CM

## 2018-04-11 DIAGNOSIS — R15.1 FECAL SMEARING: ICD-10-CM

## 2018-04-11 PROCEDURE — 97110 THERAPEUTIC EXERCISES: CPT

## 2018-04-11 PROCEDURE — 97140 MANUAL THERAPY 1/> REGIONS: CPT

## 2018-04-11 PROCEDURE — 97535 SELF CARE MNGMENT TRAINING: CPT

## 2018-04-11 NOTE — PATIENT INSTRUCTIONS
Continue to work on pelvic floor muscle relaxation: deep squat when you brush your teeth, christiano pose, knee to chest stretches    Take deep breaths throughout day to relax pelvic floor muscles    Wiping with moist wipes (Healthy [employer] wipes)     Walking for exercise     Yoga ([employer])  or Running - Use Pom[employer]

## 2018-04-11 NOTE — PROGRESS NOTES
OUTPATIENT PHYSICAL THERAPY DAILY NOTE       Patient: Akanksha Campos   [facility] #:  [id number]    Diagnosis:   Encounter Diagnoses   Name Primary?    Urinary urgency Yes    Urinary frequency     Muscle spasm     Fecal smearing       Date of treatment: 04/11/2018     Time in: 900  Time out: 1000  Billable time: 55  Visit #: 3      SUBJECTIVE   Pt reports: Patient states she is under very high stress due to an upcoming move. Continued urinary urgency and incontinence, fecal smearing. Pain: 0/10 on VAS scale    OBJECTIVE     Therapeutic Exercise to develop  flexibility for 10 minutes including:   diaphragmatic breathing and passive stretching of pfm : Knee to chest stretches in supine and standing squat  Practice with diaphragmatic breath with PFM contraction    Manual Therapy to develop flexibility and desensitization for 30 minutes including: Manual Therapy including STM and Trigger point massage to abdominals, adductors, tissues medial to ischial tuberosities.    Education: Discussed progression of plan of care with patient; educated pt in activity modification; reviewed HEP with pt. Pt demonstrated and verbalized understanding of all instruction and was provided with a handout of HEP (see Patient Instructions).      ASSESSMENT      Pt tolerated entire treatment  with no visible adverse effects. Increased tension of puborectalis and tenderness to palpation. Decreased tension to levator ani intravaginally.   Will the patient continue to benefit from skilled PT intervention? Yes  Medical necessity demonstrated by: incontinence  Progress towards goals:  good      PLAN     Continue with established Plan of Care, working toward established PT goals.   Educate on Use of [place] for home use vaginally OR rectally, intravaginal and Intrarectal [place] to uptraining when muscles relaxed

## 2018-04-18 ENCOUNTER — CLINICAL SUPPORT (OUTPATIENT)
Dept: REHABILITATION | Facility: OTHER | Age: 38
End: 2018-04-18
Attending: ANESTHESIOLOGY
Payer: COMMERCIAL

## 2018-04-18 DIAGNOSIS — R35.0 URINARY FREQUENCY: Primary | ICD-10-CM

## 2018-04-18 DIAGNOSIS — M62.838 MUSCLE SPASM: ICD-10-CM

## 2018-04-18 DIAGNOSIS — R15.1 FECAL SMEARING: ICD-10-CM

## 2018-04-18 DIAGNOSIS — R39.15 URINARY URGENCY: ICD-10-CM

## 2018-04-18 PROCEDURE — 97535 SELF CARE MNGMENT TRAINING: CPT

## 2018-04-18 PROCEDURE — 97110 THERAPEUTIC EXERCISES: CPT

## 2018-04-18 PROCEDURE — 97140 MANUAL THERAPY 1/> REGIONS: CPT

## 2018-04-18 NOTE — PROGRESS NOTES
OUTPATIENT PHYSICAL THERAPY DAILY NOTE       Patient: Akanksha Campos   [facility] #:  [id number]    Diagnosis:   No diagnosis found.   Date of treatment: 04/18/2018     Time in: 905  Time out: 1000  Billable time: 55  Visit #: 4      SUBJECTIVE   Pt reports: Patient states she has decreased urinary incontinence but continues to have fecal smearing. She is taking Metamucil daily and stool may be too soft.  Continued urinary urgency and incontinence. She reports sciatica type symptoms waking her at night and increased when doing kegels.   Pain: 0/10 on VAS scale    OBJECTIVE     Therapeutic Exercise to develop  flexibility for 10 minutes including:   diaphragmatic breathing and passive stretching of pfm : Knee to chest stretches in supine and standing squat  Practice with diaphragmatic breath with PFM contraction in supine hooklying- left leg externally rotated to decrease piriformis discomfort.     Manual Therapy to develop flexibility and desensitization for 30 minutes including: Manual Therapy including STM and Trigger point massage to left gluteals and piriformis    Education: Discussed progression of plan of care with patient; educated pt in activity modification; reviewed HEP with pt. Pt demonstrated and verbalized understanding of all instruction and was provided with a handout of HEP (see Patient Instructions).  Pouse impressa      ASSESSMENT      Pt tolerated entire treatment  with no visible adverse effects. Increased tension of puborectalis and tenderness to palpation. Decreased tension to levator ani intravaginally and able to contract/relax pelvic floor musculature.   Will the patient continue to benefit from skilled PT intervention? Yes  Medical necessity demonstrated by: incontinence  Progress towards goals:  good      PLAN     Continue with established Plan of Care, working toward established PT goals. Patient is moving to [place] in May and may transfer to a PT there.   Progress pfm contractions in  supine and into sitting, discussed dry needling but patient prefers to wait.

## 2018-05-08 ENCOUNTER — OFFICE VISIT (OUTPATIENT)
Dept: URGENT CARE | Facility: CLINIC | Age: 38
End: 2018-05-08
Payer: COMMERCIAL

## 2018-05-08 VITALS
HEIGHT: 69 IN | BODY MASS INDEX: 30.21 KG/M2 | SYSTOLIC BLOOD PRESSURE: 115 MMHG | RESPIRATION RATE: 18 BRPM | DIASTOLIC BLOOD PRESSURE: 83 MMHG | OXYGEN SATURATION: 97 % | WEIGHT: 204 LBS | TEMPERATURE: 98 F | HEART RATE: 100 BPM

## 2018-05-08 DIAGNOSIS — J32.9 SINUSITIS, UNSPECIFIED CHRONICITY, UNSPECIFIED LOCATION: Primary | ICD-10-CM

## 2018-05-08 PROCEDURE — 3008F BODY MASS INDEX DOCD: CPT | Mod: CPTII,S$GLB,, | Performed by: EMERGENCY MEDICINE

## 2018-05-08 PROCEDURE — 99214 OFFICE O/P EST MOD 30 MIN: CPT | Mod: S$GLB,,, | Performed by: EMERGENCY MEDICINE

## 2018-05-08 NOTE — PATIENT INSTRUCTIONS
"                                                          Sinusitis   If your condition worsens or fails to improve we recommend that you receive another evaluation at the ER immediately or contact your PCP to discuss your concerns or return here. You must understand that you've received an urgent care treatment only and that you may be released before all your medical problems are known or treated. You the patient will arrange for followup care as instructed.   If we discussed that I think your illness is viral it will not respond to antibiotics and it will last 10-14 days.   If you are female and on BCP and do take the antibiotics, use additional methods to prevent pregnancy while on the antibiotics and for one cycle after.   Flonase (fluticasone) is a nasal spray which is available over the counter and may help with your symptoms   Zyrtec D, Claritin D or allegra D can also help with symptoms of congestion and drainage.   If you have hypertension avoid using the "D" which is the decongestant   If you just have drainage you can take plain zyrtec, claritin or allegra   If you just have a congested feeling you can take pseudoephedrine (unless you have high blood pressure) which you have to sign for behind the counter. Do not buy the phenylephrine which is on the shelf as it is not effective   Rest and fluids are also important.   Tylenol or ibuprofen can also be used as directed for pain unless you have an allergy to them or medical condition such as stomach ulcers, kidney or liver disease or blood thinners etc for which you should not be taking these type of medications.   If you are flying in the next few days Afrin nose drops for the airplane flight upon take off and landing may help. Other than at those times refrain from using afrin.   If you were prescribed a narcotic do not drive or operate heavy machinery while taking these medications.     "

## 2018-05-08 NOTE — PROGRESS NOTES
"Subjective:       Patient ID: Akanksha Campos is a 37 y.o. female.    Vitals:  height is 5' 9" (1.753 m) and weight is 92.5 kg (204 lb). Her oral temperature is 97.8 °F (36.6 °C). Her blood pressure is 115/83 and her pulse is 100. Her respiration is 18 and oxygen saturation is 97%.     Chief Complaint: Sore Throat    Patient states she been having a sore throat for 11 days. She started with cough and congestion. The sore throat has lingered. She has 2 young kids and gets sick a lot. She was here one month ago and got a steroid shot. She got over that and then sick again. No fever      Sore Throat    This is a new problem. The current episode started 1 to 4 weeks ago. The problem has been rapidly improving. There has been no fever. The pain is at a severity of 3/10. The pain is mild. Associated symptoms include coughing, swollen glands and trouble swallowing. Pertinent negatives include no abdominal pain, congestion, ear discharge, ear pain, headaches, hoarse voice, plugged ear sensation or shortness of breath. She has had no exposure to strep or mono. She has tried gargles and NSAIDs for the symptoms. The treatment provided no relief.     Review of Systems   Constitution: Negative for chills, fever and malaise/fatigue.   HENT: Positive for sore throat and trouble swallowing. Negative for congestion, ear discharge, ear pain and hoarse voice.    Eyes: Negative for discharge and redness.   Cardiovascular: Negative for chest pain, dyspnea on exertion and leg swelling.   Respiratory: Positive for cough and sputum production. Negative for shortness of breath and wheezing.    Musculoskeletal: Negative for myalgias.   Gastrointestinal: Negative for abdominal pain and nausea.   Neurological: Negative for headaches.       Objective:      Physical Exam   Constitutional: She is oriented to person, place, and time. She appears well-developed and well-nourished. She is cooperative.  Non-toxic appearance. She does not appear " ill. No distress.   HENT:   Head: Normocephalic and atraumatic.   Right Ear: Hearing, tympanic membrane, external ear and ear canal normal.   Left Ear: Hearing, tympanic membrane, external ear and ear canal normal.   Nose: Mucosal edema present. No rhinorrhea or nasal deformity. No epistaxis. Right sinus exhibits no maxillary sinus tenderness and no frontal sinus tenderness. Left sinus exhibits no maxillary sinus tenderness and no frontal sinus tenderness.   Mouth/Throat: Uvula is midline and mucous membranes are normal. No trismus in the jaw. Normal dentition. No uvula swelling. Posterior oropharyngeal erythema present. Tonsils are 1+ on the right. Tonsils are 1+ on the left.   Eyes: Conjunctivae, EOM and lids are normal. Pupils are equal, round, and reactive to light. No scleral icterus.   Sclera clear bilat   Neck: Trachea normal, normal range of motion, full passive range of motion without pain and phonation normal. Neck supple.   Cardiovascular: Normal rate, regular rhythm, normal heart sounds, intact distal pulses and normal pulses.    Pulmonary/Chest: Effort normal and breath sounds normal. No respiratory distress.   Abdominal: Soft. Normal appearance and bowel sounds are normal. She exhibits no distension. There is no tenderness.   Musculoskeletal: Normal range of motion. She exhibits no edema or deformity.   Lymphadenopathy:     She has cervical adenopathy.        Right cervical: Superficial cervical adenopathy present.        Left cervical: Superficial cervical adenopathy present.   Neurological: She is alert and oriented to person, place, and time. She exhibits normal muscle tone. Coordination normal.   Skin: Skin is warm, dry and intact. She is not diaphoretic. No pallor.   Psychiatric: She has a normal mood and affect. Her speech is normal and behavior is normal. Judgment and thought content normal. Cognition and memory are normal.   Nursing note and vitals reviewed.      Assessment:       1. Sinusitis,  "unspecified chronicity, unspecified location        Plan:         Sinusitis, unspecified chronicity, unspecified location    Other orders  -     (Magic mouthwash) 1:1:1 Benadryl 12.5mg/5ml liq, aluminum & magnesium hydroxide-simehticone (Maalox), lidocaine viscous 2%; 10 cc swish and spit every 4 hours as needed for mouth sores or sore throat  Dispense: 90 mL; Refill: 0          Patient Instructions                                                             Sinusitis   If your condition worsens or fails to improve we recommend that you receive another evaluation at the ER immediately or contact your PCP to discuss your concerns or return here. You must understand that you've received an urgent care treatment only and that you may be released before all your medical problems are known or treated. You the patient will arrange for followup care as instructed.   If we discussed that I think your illness is viral it will not respond to antibiotics and it will last 10-14 days.   If you are female and on BCP and do take the antibiotics, use additional methods to prevent pregnancy while on the antibiotics and for one cycle after.   Flonase (fluticasone) is a nasal spray which is available over the counter and may help with your symptoms   Zyrtec D, Claritin D or allegra D can also help with symptoms of congestion and drainage.   If you have hypertension avoid using the "D" which is the decongestant   If you just have drainage you can take plain zyrtec, claritin or allegra   If you just have a congested feeling you can take pseudoephedrine (unless you have high blood pressure) which you have to sign for behind the counter. Do not buy the phenylephrine which is on the shelf as it is not effective   Rest and fluids are also important.   Tylenol or ibuprofen can also be used as directed for pain unless you have an allergy to them or medical condition such as stomach ulcers, kidney or liver disease or blood thinners etc for " which you should not be taking these type of medications.   If you are flying in the next few days Afrin nose drops for the airplane flight upon take off and landing may help. Other than at those times refrain from using afrin.   If you were prescribed a narcotic do not drive or operate heavy machinery while taking these medications.

## 2019-04-17 ENCOUNTER — HOSPITAL ENCOUNTER (OUTPATIENT)
Dept: HOSPITAL 80 - FIMAGING | Age: 39
End: 2019-04-17
Attending: FAMILY MEDICINE
Payer: COMMERCIAL

## 2019-04-17 DIAGNOSIS — R19.7: ICD-10-CM

## 2019-04-17 DIAGNOSIS — R10.9: ICD-10-CM

## 2019-04-17 DIAGNOSIS — N92.6: Primary | ICD-10-CM

## 2019-04-17 DIAGNOSIS — Z97.5: ICD-10-CM
